# Patient Record
(demographics unavailable — no encounter records)

---

## 2018-11-09 NOTE — RAD
Ultrasound thyroid 11/9/2018

 

CLINICAL INDICATION: Elevated TPO.

 

COMPARISON: None.

 

FINDINGS: Right lobe of the thyroid is diffusely heterogeneous measuring 

5.9 x 1.7 x 2.0 cm.

 

Left lobe of thyroid is diffusely heterogeneous measuring 5.0 x 1.6 x 2.3 

cm. No discrete thyroid nodule in either lobe.

 

IMPRESSION: Diffuse thyroid heterogeneity, may be seen in sequela of 

thyroiditis.

 

Electronically signed by: Andres Cano MD (11/9/2018 2:34 PM) [id number]

## 2020-06-26 NOTE — PHYS DOC
General Adult


EDM:


Chief Complaint:  CHEST PAIN





HPI:


HPI:





Patient is a 37-year-old male presenting to the ED with a chief complaint of 

chest pain.  Patient states that the pain started today and he describes it as 

chest pressure.  Patient also states that it radiates to his left upper 

extremity.  Patient decided he has a history of high blood pressure and high 

cholesterol and he quit smoking 84 days ago.  Patient states that he already 

lost about 70 pounds and is trying to get healthy.  Patient denies family 

history of cardiac disease.  Patient states that he has never had a stress test 

in the past.  Patient denies fever, chills, shortness of breath..





Review of Systems:


Review of Systems:


Constitutional:  Denies fever or chills 


Eyes:  Denies change in visual acuity 


HENT:  Denies nasal congestion or sore throat 


Respiratory:  Denies cough or shortness of breath 


Cardiovascular: Complains of chest pressure


GI:  Denies abdominal pain, nausea, vomiting, bloody stools or diarrhea 


: Denies dysuria 


Musculoskeletal:  Denies back pain or joint pain 


Neurologic:  Denies headache, focal weakness or sensory changes





Heart Score:


HEART Score for Chest Pain:  








HEART Score for Chest Pain Response (Comments) Value


 


History Moderately Suspicious 1


 


ECG Normal 0


 


Age < 45 0


 


Risk Factors                            1 or 2 Risk Factors 1


 


Troponin < Normal Limit 0


 


Total  2








Risk Factors:


Risk Factors:  DM, Current or recent (<one month) smoker, HTN, HLP, family 

history of CAD, obesity.


Risk Scores:


Score 0 - 3:  2.5% MACE over next 6 weeks - Discharge Home


Score 4 - 6:  20.3% MACE over next 6 weeks - Admit for Clinical Observation


Score 7 - 10:  72.7% MACE over next 6 weeks - Early Invasive Strategies





Allergies:


Allergies:





Allergies








Coded Allergies Type Severity Reaction Last Updated Verified


 


  No Known Drug Allergies    5/13/17 No











Physical Exam:


PE:





Constitutional: Well developed, well nourished, no acute distress, non-toxic 

appearance. []


HENT: Normocephalic, atraumatic


Eyes: EOMI


Neck: Normal range of motion, Supple


Cardiovascular: Heart rate regular rhythm


Lungs & Thorax:  Bilateral breath sounds clear to auscultation []


Abdomen: Bowel sounds normal, soft, no tenderness


Extremities: No tenderness, ROM intact


Neurologic: Alert and oriented X 3





EKG:


EKG:


EKG interpretation:


17: 06 on 6/26/2020


HR: 81


Sinus rhythm


Regular intervals


Normal axis


Nonspecific ST changes


No STEMI





Radiology/Procedures:


Radiology/Procedures:


[]


Impressions:


CXR 


IMPRESSION: No acute pulmonary finding.





Course & Med Decision Making:


Course & Med Decision Making


Pertinent Labs and Imaging studies reviewed. (See chart for details)





EKG does not show any acute changes.





Patient blood pressure was elevated and so he was given Catapres 0.2 mg oral 

tablet





Troponin is negative.





Chest x-ray does not show any acute disease.





Patient blood pressure is 196/101.


Ordered hydralazine 10 mg IV.





Discussed admission with the patient but patient does not want to be admitted 

due to the coronavirus pandemic.


Patient states that he will follow-up with his doctor on Monday which is 2 days 

from now.


Patient states that the pain started at 7 AM this morning so no repeat troponin 

is needed.





We will recheck blood pressure before patient is discharged home.





Dragon Disclaimer:


Dragon Disclaimer:


This electronic medical record was generated, in whole or in part, using a voice

 recognition dictation system.





Departure


Departure:


Impression:  


   Primary Impression:  


   Chest pain


   Additional Impression:  


   Hypertension


Disposition:  01 HOME/RESIDENCE PRIOR TO ADM


Condition:  STABLE


Referrals:  


SHOBHA MCDANIELS (PCP)


Patient Instructions:  Chest Pain (Nonspecific), Hypertension





Additional Instructions:  


Discussed  results and plan of care with patient.


Patient is instructed to follow up with PCP in one to 2 days.


Appropriate discharge instructions given to patient to return to the ED or to 

seek immediate medical evaluation.


Patient is instructed to return to the ED if symptoms worsen or if any concerns.





Justification of Admission:


Justification of Admission:


Justification of Admission Dx:  ALONDRA Silverman DO           Jun 26, 2020 16:57

## 2020-06-26 NOTE — EKG
[facility] [address], [place] [address]

Test Date:    2020               Test Time:    17:00:11

Pat Name:     MARLEY AGUIRRE           Department:   

Patient ID:   [id number]           Room:          

Gender:       M                        Technician:   SKYE

:          1982               Requested By: ALONDRA LOU

Order Number: [id number]            Reading MD:     

                                 Measurements

Intervals                              Axis          

Rate:         81                       P:            47

OR:           184                      QRS:          8

QRSD:         100                      T:            25

QT:           382                                    

QTc:          444                                    

                           Interpretive Statements

SINUS RHYTHM

NORMAL ECG

RI6.02

No previous ECG available for comparison

## 2020-06-26 NOTE — RAD
EXAM: Chest, 2 views.

 

HISTORY: Shortness of breath. Chest pain.

 

COMPARISON: None.

 

FINDINGS: 2 views of the chest are obtained. There is no infiltrate, 

pleural effusion or pneumothorax. The heart is normal in size. There is 

mild elevation of the right hemidiaphragm.

 

IMPRESSION: No acute pulmonary finding.

 

Electronically signed by: Shelly Jovel MD (6/26/2020 5:21 PM) [id number]

## 2020-08-05 NOTE — PHYS DOC
Past History


Past Medical History:  Depression, High Cholesterol, Hypertension


Past Surgical History:  Tonsillectomy


Alcohol Use:  Occasionally





General Adult


EDM:


Chief Complaint:  DENTAL PROBLEM





HPI:


HPI:


".. I got these teeth pulled today... and the bleeding will not stop... and  

pain... 





Patient is a 37 year old male who presents with above hx and complaints of 

dental pain, dental extraction, and bleeding from teeth 2. and 3 sockets.   

Patient has been taking ibuprofen at home.  Patient previously completed a 

course of antibiotics prior to extractions.  Patient follow-up dentist Dr. Singh.  Patient has no trismus.  Patient has no adenopathy.  No obvious 

pointing abscesses.  Patient denies any history of immunosuppression.  Patient 

denies any recent travel.  Patient denies any history of ill contacts.  Does 

have multiple areas of dental decay.  No history of coagulopathy with him or 

family members.





Review of Systems:


Review of Systems:


Constitutional:  Denies fever or chills 


Eyes:  Denies change in visual acuity 


HENT:  Denies nasal congestion or sore throat.  Complains of dental pain and 

dental bleeding and areas of teeth 2 and 3


Respiratory:  Denies cough or shortness of breath 


Cardiovascular:  Denies chest pain or edema 


GI:  Denies abdominal pain, nausea, vomiting, bloody stools or diarrhea 


: Denies dysuria 


Musculoskeletal:  Denies back pain or joint pain 


Integument:  Denies rash 


Neurologic:  Denies headache, focal weakness or sensory changes 


Endocrine:  Denies polyuria or polydipsia 


Lymphatic:  Denies swollen glands 


Psychiatric:  Denies depression or anxiety





Heart Score:


Risk Factors:


Risk Factors:  DM, Current or recent (<one month) smoker, HTN, HLP, family 

history of CAD, obesity.


Risk Scores:


Score 0 - 3:  2.5% MACE over next 6 weeks - Discharge Home


Score 4 - 6:  20.3% MACE over next 6 weeks - Admit for Clinical Observation


Score 7 - 10:  72.7% MACE over next 6 weeks - Early Invasive Strategies





Family History:


Family History:


Noncontributory





Current Medications:


Current Meds:


See nursing for home meds





Allergies:


Allergies:





Allergies








Coded Allergies Type Severity Reaction Last Updated Verified


 


  No Known Drug Allergies    5/13/17 No











Physical Exam:


PE:





Constitutional: Moderate acute distress, non-toxic appearance. []


HENT: Normocephalic, atraumatic, bilateral external ears normal, oropharynx 

moist, no oral exudates, nose normal.  Bleeding from tooth sockets 2 and 3.  No 

pointing abscess.  No trismus.


Eyes: PERRLA, EOMI, conjunctiva normal, no discharge. [] 


Neck: Normal range of motion, no tenderness, supple, no stridor. [] 


Cardiovascular:Heart rate regular rhythm, no murmur []


Lungs & Thorax:  Bilateral breath sounds equal at apex with scattered wheezes 

auscultation []


Abdomen: Bowel sounds normal, soft, no tenderness, no masses, no pulsatile 

masses.  Obese


Skin: Warm, dry, no erythema, no rash.  Multiple tattoos.  No excessive bleeding

 or petechiae


Back: No tenderness, no CVA tenderness. [] 


Extremities: No tenderness, no cyanosis, no clubbing, ROM intact, no edema. [] 


Neurologic: Alert and oriented X 3, normal motor function, normal sensory 

function, no focal deficits noted. []


Psychologic: Affect anxious, judgement normal, mood normal. []





EKG:


EKG:


[]





Radiology/Procedures:


Radiology/Procedures:


[]





Course & Med Decision Making:


Course & Med Decision Making


Pertinent Labs and Imaging studies reviewed. (See chart for details)





Patient to continue a soft diet.  Patient to continue direct pressure on 

bleeding sockets with gauze and a tea bag..   Stop the use of ibuprofen since 

excess use can cause bleeding.  Take Tylenol for pain.  May take Percocet for 

marked pain.  Follow-up with primary care.  Follow-up with dentist.  Return if 

any concerns.  Do not smoke. 





 Impression:





1.  Bleeding from dental extractions tooth 2 and 3


2.  Dental caries


3.  Dental pain




















[]





Dragon Disclaimer:


Dragon Disclaimer:


This electronic medical record was generated, in whole or in part, using a voice

 recognition dictation system.





Departure


Departure:


Disposition:  01 HOME/RESIDENCE PRIOR TO ADM


Condition:  STABLE


Referrals:  


SHOBHA MCDANIELS (PCP)


Scripts


Oxycodone Hcl/Acetaminophen (PERCOCET 5-325 MG TABLET  **) 1 Each Tablet


2 TAB PO PRN QID for pain MDD 4 Tablet(s), #30 TAB 0 Refills


   Prov: PAYAL WEEMS MD         8/5/20





Justification of Admission:


Justification of Admission:


Justification of Admission Dx:  N/A





Dragon Disclaimer


This chart was dictated in whole or in part using Voice Recognition software in 

a busy, high-work load, and often noisy Emergency Department environment.  It 

may contain unintended and wholly unrecognized errors or omissions.











PAYAL WEEMS MD            Aug 5, 2020 21:35
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
